# Patient Record
Sex: FEMALE | Race: WHITE | Employment: UNEMPLOYED | URBAN - METROPOLITAN AREA
[De-identification: names, ages, dates, MRNs, and addresses within clinical notes are randomized per-mention and may not be internally consistent; named-entity substitution may affect disease eponyms.]

---

## 2017-01-03 ENCOUNTER — OFFICE VISIT (OUTPATIENT)
Dept: CARDIOLOGY CLINIC | Age: 37
End: 2017-01-03

## 2017-01-03 DIAGNOSIS — Z95.818 STATUS POST PLACEMENT OF IMPLANTABLE LOOP RECORDER: Primary | ICD-10-CM

## 2017-01-10 ENCOUNTER — OFFICE VISIT (OUTPATIENT)
Dept: CARDIOLOGY CLINIC | Age: 37
End: 2017-01-10

## 2017-01-10 DIAGNOSIS — Z95.818 STATUS POST PLACEMENT OF IMPLANTABLE LOOP RECORDER: Primary | ICD-10-CM

## 2017-01-23 ENCOUNTER — OFFICE VISIT (OUTPATIENT)
Dept: CARDIOLOGY CLINIC | Age: 37
End: 2017-01-23

## 2017-01-23 DIAGNOSIS — Z95.818 STATUS POST PLACEMENT OF IMPLANTABLE LOOP RECORDER: Primary | ICD-10-CM

## 2017-02-27 ENCOUNTER — OFFICE VISIT (OUTPATIENT)
Dept: CARDIOLOGY CLINIC | Age: 37
End: 2017-02-27

## 2017-02-27 DIAGNOSIS — Z95.818 STATUS POST PLACEMENT OF IMPLANTABLE LOOP RECORDER: Primary | ICD-10-CM

## 2017-11-05 ENCOUNTER — APPOINTMENT (OUTPATIENT)
Dept: CT IMAGING | Age: 37
End: 2017-11-05
Attending: EMERGENCY MEDICINE
Payer: SELF-PAY

## 2017-11-05 ENCOUNTER — HOSPITAL ENCOUNTER (EMERGENCY)
Age: 37
Discharge: HOME | End: 2017-11-05
Attending: EMERGENCY MEDICINE
Payer: SELF-PAY

## 2017-11-05 VITALS
WEIGHT: 165 LBS | DIASTOLIC BLOOD PRESSURE: 72 MMHG | OXYGEN SATURATION: 100 % | HEIGHT: 63 IN | TEMPERATURE: 98 F | RESPIRATION RATE: 16 BRPM | HEART RATE: 69 BPM | SYSTOLIC BLOOD PRESSURE: 101 MMHG | BODY MASS INDEX: 29.23 KG/M2

## 2017-11-05 DIAGNOSIS — F07.81 POST CONCUSSION SYNDROME: ICD-10-CM

## 2017-11-05 DIAGNOSIS — S09.90XA CLOSED HEAD INJURY, INITIAL ENCOUNTER: Primary | ICD-10-CM

## 2017-11-05 PROCEDURE — 99283 EMERGENCY DEPT VISIT LOW MDM: CPT

## 2017-11-05 PROCEDURE — 70450 CT HEAD/BRAIN W/O DYE: CPT

## 2017-11-05 RX ORDER — LAMOTRIGINE 200 MG/1
TABLET ORAL 2 TIMES DAILY
COMMUNITY

## 2017-11-05 NOTE — ED PROVIDER NOTES
Patient is a 30 y.o. female presenting with head injury. Head Injury          Past Medical History:   Diagnosis Date    Anxiety     Depression     Disc herniation     L5-S1    Fainting     Fatigue     Headache     Memory loss     Sleep apnea     mild       Past Surgical History:   Procedure Laterality Date    HX GI  2010    GASTRIC SLEEVE    HX LAP CHOLECYSTECTOMY  7/11/13    by Dr Alin Dunham HX OTHER SURGICAL      plantar warts exc. drew. feet (multiple times under local)    HX OTHER SURGICAL  2010    gastric sleeve    IMPLANT  LOOP RECORDER  7/17/2015              Family History:   Problem Relation Age of Onset    Diabetes Mother     Other Mother      s/p gastric bypass    Hypertension Father     Headache Father      MI, STENTS, A FIB    Stroke Father     Other Sister      s/p gastric bypass; low bone mass    Asthma Maternal Aunt     Diabetes Maternal Grandmother     Asthma Maternal Grandfather     Cancer Neg Hx     Malignant Hyperthermia Neg Hx     Pseudocholinesterase Deficiency Neg Hx     Delayed Awakening Neg Hx     Post-op Nausea/Vomiting Neg Hx     Asthma Maternal Aunt        Social History     Social History    Marital status:      Spouse name: N/A    Number of children: N/A    Years of education: N/A     Occupational History    Not on file. Social History Main Topics    Smoking status: Never Smoker    Smokeless tobacco: Never Used    Alcohol use 0.0 - 0.6 oz/week     0 - 1 Standard drinks or equivalent per week      Comment: occasional    Drug use: No    Sexual activity: Yes     Partners: Female     Other Topics Concern    Not on file     Social History Narrative         ALLERGIES: Betadine [povidone-iodine] and Iodine    Review of Systems   All other systems reviewed and are negative.       Vitals:    11/05/17 1111   BP: 107/66   Pulse: 69   Resp: 16   SpO2: 100%   Weight: 74.8 kg (165 lb)   Height: 5' 3\" (1.6 m)            Physical Exam Constitutional: She is oriented to person, place, and time. She appears well-developed and well-nourished. HENT:   Head: Normocephalic and atraumatic. Mouth/Throat: Oropharynx is clear and moist. No oropharyngeal exudate. Eyes: Conjunctivae are normal. No scleral icterus. Neck: Neck supple. No thyromegaly present. Cardiovascular: Normal rate, regular rhythm and normal heart sounds. Exam reveals no gallop and no friction rub. No murmur heard. Pulmonary/Chest: Effort normal and breath sounds normal. No stridor. No respiratory distress. She has no wheezes. She has no rales. Abdominal: Soft. Bowel sounds are normal. There is no tenderness. There is no rebound and no guarding. Musculoskeletal: Normal range of motion. Lymphadenopathy:     She has no cervical adenopathy. Neurological: She is alert and oriented to person, place, and time. Skin: Skin is warm and dry. Psychiatric: She has a normal mood and affect.         [facility]  ED Course       Procedures

## 2017-11-05 NOTE — ED NOTES
Dr. Arlette Donahue at bedside to evaluate patient. Patient updated regarding plan of care and associated time constraints. Patient verbalizes understanding and agreement to current care plan. Call bell in reach. Will continue to monitor.

## 2017-11-05 NOTE — ED TRIAGE NOTES
Patient brought to ED treatment area via wheelchair for complaint of \"she had two seizures last year and the one on Friday she was standing int he bed of the truck and fell backwards hitting her head on the brick pavers. Since then she has had balance issues, memory issues, and feels dizzy. \" Patient reports nausea and vomiting Friday evening.

## 2017-11-05 NOTE — DISCHARGE INSTRUCTIONS
Learning About a Closed Head Injury  What is a closed head injury? A closed head injury happens when your head gets hit hard. The strong force of the blow causes your brain to shake in your skull. This movement can cause the brain to bruise, swell, or tear. Sometimes nerves or blood vessels also get damaged. This can cause bleeding in or around the brain. A concussion is a type of closed head injury. What are the symptoms? If you have a mild concussion, you may have a mild headache or feel \"not quite right. \" These symptoms are common. They usually go away over a few days to 4 weeks. But sometimes after a concussion, you feel like you can't function as well as before the injury. And you have new symptoms. This is called postconcussive syndrome. You may:  · Find it harder to solve problems, think, concentrate, or remember. · Have headaches. · Have changes in your sleep patterns, such as not being able to sleep or sleeping all the time. · Have changes in your personality. · Not be interested in your usual activities. · Feel angry or anxious without a clear reason. · Lose your sense of taste or smell. · Be dizzy, lightheaded, or unsteady. It may be hard to stand or walk. How is a closed head injury treated? Any person who may have a concussion needs to see a doctor. Some people have to stay in the hospital to be watched. Others can go home safely. If you go home, follow your doctor's instructions. He or she will tell you if you need someone to watch you closely for the next 24 hours or longer. Rest is the best treatment. Get plenty of sleep at night. And try to rest during the day. · Avoid activities that are physically or mentally demanding. These include housework, exercise, and schoolwork. And don't play video games, send text messages, or use the computer. You may need to change your school or work schedule to be able to avoid these activities.   · Ask your doctor when it's okay to drive, ride a bike, or operate machinery. · Take an over-the-counter pain medicine, such as acetaminophen (Tylenol), ibuprofen (Advil, Motrin), or naproxen (Aleve). Be safe with medicines. Read and follow all instructions on the label. · Check with your doctor before you use any other medicines for pain. · Do not drink alcohol or use illegal drugs. They can slow recovery. They can also increase your risk of getting a second head injury. Follow-up care is a key part of your treatment and safety. Be sure to make and go to all appointments, and call your doctor if you are having problems. It's also a good idea to know your test results and keep a list of the medicines you take. Where can you learn more? Go to http://williams-alberto.info/. Enter E235 in the search box to learn more about \"Learning About a Closed Head Injury. \"  Current as of: October 14, 2016  Content Version: 11.4  © 3370-4995 [employer]. Care instructions adapted under license by [employer] (which disclaims liability or warranty for this information). If you have questions about a medical condition or this instruction, always ask your healthcare professional. [place] [address] any warranty or liability for your use of this information. Postconcussion Syndrome: Care Instructions  Your Care Instructions    Postconcussion syndrome occurs after a blow to the head or body. Common symptoms are changes in the ability to concentrate, think, remember, or solve problems. Symptoms, which may include headaches, personality changes, and dizziness, may be related to stress from the events surrounding the accident that caused the injury. Follow-up care is a key part of your treatment and safety. Be sure to make and go to all appointments, and call your doctor if you are having problems. It's also a good idea to know your test results and keep a list of the medicines you take.   How can you care for yourself at home? Pain  · Rest is the best treatment for postconcussion syndrome. · Do not drive if you have taken a prescription pain medicine. · Rest in a quiet, dark room until your headache is gone. Close your eyes and try to relax or go to sleep. Do not watch TV or read. · Put a cold, moist cloth or cold pack on the painful area for 10 to 20 minutes at a time. Put a thin cloth between the cold pack and your skin. · Have someone gently massage your neck and shoulders. · Take your medicines exactly as prescribed. Call your doctor if you think you are having a problem with your medicine. You will get more details on the specific medicines your doctor prescribes. Stress  · Try to reduce stress. Some ways to do this include:  ¨ Taking slow, deep breaths. ¨ Soaking in a warm bath. ¨ Listening to soothing music. ¨ Taking a yoga class. ¨ Having a massage or back rub. ¨ Drinking a warm, nonalcoholic, noncaffeinated beverage. · Get enough sleep. · Eat a healthy, balanced diet. A balanced diet includes whole grains, dairy, fruits and vegetables, and protein. Eat a variety of foods from each of those groups so you get all the nutrients you need. · Avoid alcohol and illegal drugs. · Try relaxation exercises, such as breathing and muscle relaxation exercises. · Talk to your doctor about counseling. It may help you deal with stress from your accident. When should you call for help? Watch closely for changes in your health, and be sure to contact your doctor if:  ? · You do not get better as expected. ? · Your symptoms, such as headaches, trouble concentrating, or changes in mood, get worse. Where can you learn more? Go to http://williams-alberto.info/. Enter L952 in the search box to learn more about \"Postconcussion Syndrome: Care Instructions. \"  Current as of: October 14, 2016  Content Version: 11.4  © 6381-9714 [employer].  Care instructions adapted under license by [employer] (which disclaims liability or warranty for this information). If you have questions about a medical condition or this instruction, always ask your healthcare professional. [address] any warranty or liability for your use of this information.

## 2017-11-05 NOTE — ED NOTES
Patient discharged by Dr. Lennox Host. Patient and wife have no further questions or concerns at this time. Patient took all personal belongings at discharge. Patient ambulated out of ED with wife.

## 2017-11-13 ENCOUNTER — HOSPITAL ENCOUNTER (EMERGENCY)
Age: 37
Discharge: HOME | End: 2017-11-13
Attending: EMERGENCY MEDICINE
Payer: SELF-PAY

## 2017-11-13 VITALS
SYSTOLIC BLOOD PRESSURE: 122 MMHG | OXYGEN SATURATION: 100 % | DIASTOLIC BLOOD PRESSURE: 70 MMHG | RESPIRATION RATE: 18 BRPM | TEMPERATURE: 98.1 F | HEART RATE: 95 BPM | WEIGHT: 152 LBS | HEIGHT: 66 IN | BODY MASS INDEX: 24.43 KG/M2

## 2017-11-13 DIAGNOSIS — R55 SYNCOPE AND COLLAPSE: ICD-10-CM

## 2017-11-13 DIAGNOSIS — S81.812A LEG LACERATION, LEFT, INITIAL ENCOUNTER: Primary | ICD-10-CM

## 2017-11-13 PROCEDURE — 77030002888 HC SUT CHRMC J&J -A

## 2017-11-13 PROCEDURE — 77030002916 HC SUT ETHLN J&J -A

## 2017-11-13 PROCEDURE — 75810000294 HC INTERM/LAYERED WND RPR

## 2017-11-13 PROCEDURE — 74011000250 HC RX REV CODE- 250: Performed by: NURSE PRACTITIONER

## 2017-11-13 PROCEDURE — 77030018836 HC SOL IRR NACL ICUM -A

## 2017-11-13 PROCEDURE — 99282 EMERGENCY DEPT VISIT SF MDM: CPT

## 2017-11-13 RX ORDER — LIDOCAINE HYDROCHLORIDE AND EPINEPHRINE 10; 10 MG/ML; UG/ML
1.5 INJECTION, SOLUTION INFILTRATION; PERINEURAL ONCE
Status: COMPLETED | OUTPATIENT
Start: 2017-11-13 | End: 2017-11-13

## 2017-11-13 RX ADMIN — LIDOCAINE HYDROCHLORIDE,EPINEPHRINE BITARTRATE 15 MG: 10; .01 INJECTION, SOLUTION INFILTRATION; PERINEURAL at 12:49

## 2017-11-13 NOTE — ED PROVIDER NOTES
HPI Comments: Anthony Claudio is a 29 yo WF presenting to ED via car with c/o leg laceration x just PTA. The patient states that she has a hx of seizures and memory lapses. The patient states that she was outside near a grill and when she woke up, the grill was on top of her and she had a laceration to her left leg. The patient denies CP/Chills/head injury or other trauma. A review of the patient's previous medical records indicate a history of seizures and lacerations. The patient is in NAD at this time. PCP: Gali Floyd MD    There were no other complaints, changes, physical findings at this time. Past Medical History:  No date: Anxiety  No date: Depression  No date: Disc herniation      Comment: L5-S1  No date: Fainting  No date: Fatigue  No date: Headache  No date: Memory loss  No date: Sleep apnea      Comment: mild      The history is provided by the patient. No  was used. Past Medical History:   Diagnosis Date    Anxiety     Depression     Disc herniation     L5-S1    Fainting     Fatigue     Headache     Memory loss     Sleep apnea     mild       Past Surgical History:   Procedure Laterality Date    HX GI  2010    GASTRIC SLEEVE    HX LAP CHOLECYSTECTOMY  7/11/13    by Dr Forde Crimes HX OTHER SURGICAL      plantar warts exc. drew.  feet (multiple times under local)    HX OTHER SURGICAL  2010    gastric sleeve    IMPLANT  LOOP RECORDER  7/17/2015              Family History:   Problem Relation Age of Onset    Diabetes Mother     Other Mother      s/p gastric bypass    Hypertension Father     Headache Father      MI, STENTS, A FIB    Stroke Father     Other Sister      s/p gastric bypass; low bone mass    Asthma Maternal Aunt     Diabetes Maternal Grandmother     Asthma Maternal Grandfather     Cancer Neg Hx     Malignant Hyperthermia Neg Hx     Pseudocholinesterase Deficiency Neg Hx     Delayed Awakening Neg Hx     Post-op Nausea/Vomiting Neg Hx     Asthma Maternal Aunt        Social History     Social History    Marital status:      Spouse name: N/A    Number of children: N/A    Years of education: N/A     Occupational History    Not on file. Social History Main Topics    Smoking status: Never Smoker    Smokeless tobacco: Never Used    Alcohol use 0.0 - 0.6 oz/week     0 - 1 Standard drinks or equivalent per week      Comment: occasional    Drug use: No    Sexual activity: Yes     Partners: Female     Other Topics Concern    Not on file     Social History Narrative         ALLERGIES: Betadine [povidone-iodine] and Iodine    Review of Systems   Constitutional: Negative. Negative for chills, diaphoresis and fever. HENT: Negative. Negative for congestion, rhinorrhea and trouble swallowing. Eyes: Negative. Respiratory: Negative. Negative for shortness of breath. Cardiovascular: Negative. Gastrointestinal: Negative. Negative for abdominal pain, nausea and vomiting. Endocrine: Negative. Musculoskeletal: Negative for arthralgias, myalgias, neck pain and neck stiffness. Skin: Positive for wound. Negative for rash. Laceration to left inner thigh   Allergic/Immunologic: Negative. Neurological: Negative. Negative for dizziness, syncope, weakness and headaches. Hematological: Negative. Psychiatric/Behavioral: Negative. Vitals:    11/13/17 1233   BP: 122/70   Pulse: 95   Resp: 18   Temp: 98.1 °F (36.7 °C)   SpO2: 100%   Weight: 68.9 kg (152 lb)   Height: 5' 6\" (1.676 m)            Physical Exam   Constitutional: She is oriented to person, place, and time. Vital signs are normal. She appears well-developed and well-nourished. Non-toxic appearance. She does not have a sickly appearance. She does not appear ill. HENT:   Head: Normocephalic and atraumatic. Eyes: Conjunctivae, EOM and lids are normal. Pupils are equal, round, and reactive to light.    Neck: Trachea normal, normal range of motion and full passive range of motion without pain. Neck supple. Cardiovascular: Normal rate, regular rhythm, normal heart sounds and normal pulses. Pulmonary/Chest: Effort normal and breath sounds normal.   Abdominal: Soft. Normal appearance and bowel sounds are normal.   Musculoskeletal: Normal range of motion. Neurological: She is alert and oriented to person, place, and time. She has normal strength. GCS eye subscore is 4. GCS verbal subscore is 5. GCS motor subscore is 6. Skin: Skin is warm and dry. Laceration noted. See attached photo   Psychiatric: She has a normal mood and affect. Her speech is normal and behavior is normal. Judgment and thought content normal. Cognition and memory are normal.   Nursing note and vitals reviewed. MDM  Number of Diagnoses or Management Options  Leg laceration, left, initial encounter: minor  Syncope and collapse: minor  Risk of Complications, Morbidity, and/or Mortality  Presenting problems: minimal  Diagnostic procedures: minimal  Management options: low    Patient Progress  Patient progress: stable    ED Course       Procedures    Procedure Note - Laceration Repair:  1:00 PM  Procedure by All RUANO. Complexity: complex  5 cm linear laceration to thigh  was irrigated copiously with NS under jet lavage, prepped with Hibiclens and draped in a sterile fashion. The area was anesthetized with 9 mLs of  Lidocaine 1% with epinephrine via local infiltration. The wound was explored with the following results: No foreign bodies found. The wound was repaired with Two layer suture closure: Subcutaneous Layer:  1 sutures placed, stitch type:horizontal mattress, suture: 4-0 chromic gut. Skin Layer:  7 sutures placed, stitch type:simple interrupted, suture: 4-0 nylon. .  The wound was closed with good hemostasis and approximation. Sterile dressing applied.   Estimated blood loss: 5 ml's  The procedure took 1-15 minutes, and pt tolerated well. LABORATORY TESTS:  No results found for this or any previous visit (from the past 12 hour(s)). IMAGING RESULTS:    MEDICATIONS GIVEN:  Medications   lidocaine-EPINEPHrine (XYLOCAINE) 1 %-1:100,000 injection 15 mg (15 mg SubCUTAneous Given by Provider 11/13/17 9003)       IMPRESSION:  1. Leg laceration, left, initial encounter    2. Syncope and collapse        PLAN:  1. Sutures out in 8-10 days  2. F/U with PCP and Neurology  Return to ED if worse    Discharge Note  1:11 PM  The patient is ready for discharge. The patient's signs, symptoms, diagnosis, and discharge instructions have been discussed and the patient has conveyed their understanding. The patient is to follow up as recommended or return to the ER should their symptoms worsen. Plan has been discussed and the patient is in agreement. Noah Wise Pagé FNP-BC.

## 2017-11-13 NOTE — DISCHARGE INSTRUCTIONS
Fainting: Care Instructions  Your Care Instructions    When you faint, or pass out, you lose consciousness for a short time. A brief drop in blood flow to the brain often causes it. When you fall or lie down, more blood flows to your brain and you regain consciousness. Emotional stress, pain, or overheating-especially if you have been standing-can make you faint. In these cases, fainting is usually not serious. But fainting can be a sign of a more serious problem. Your doctor may want you to have more tests to rule out other causes. The treatment you need depends on the reason why you fainted. The doctor has checked you carefully, but problems can develop later. If you notice any problems or new symptoms, get medical treatment right away. Follow-up care is a key part of your treatment and safety. Be sure to make and go to all appointments, and call your doctor if you are having problems. It's also a good idea to know your test results and keep a list of the medicines you take. How can you care for yourself at home? · Drink plenty of fluids to prevent dehydration. If you have kidney, heart, or liver disease and have to limit fluids, talk with your doctor before you increase your fluid intake. When should you call for help? Call 911 anytime you think you may need emergency care. For example, call if:  ? · You have symptoms of a heart problem. These may include:  ¨ Chest pain or pressure. ¨ Severe trouble breathing. ¨ A fast or irregular heartbeat. ¨ Lightheadedness or sudden weakness. ¨ Coughing up pink, foamy mucus. ¨ Passing out. After you call 911, the  may tell you to chew 1 adult-strength or 2 to 4 low-dose aspirin. Wait for an ambulance. Do not try to drive yourself. ? · You have symptoms of a stroke. These may include:  ¨ Sudden numbness, tingling, weakness, or loss of movement in your face, arm, or leg, especially on only one side of your body. ¨ Sudden vision changes.   ¨ Sudden trouble speaking. ¨ Sudden confusion or trouble understanding simple statements. ¨ Sudden problems with walking or balance. ¨ A sudden, severe headache that is different from past headaches. ? · You passed out (lost consciousness) again. ? Watch closely for changes in your health, and be sure to contact your doctor if:  ? · You do not get better as expected. Where can you learn more? Go to http://williams-alberto.info/. Enter N969 in the search box to learn more about \"Fainting: Care Instructions. \"  Current as of: March 20, 2017  Content Version: 11.4  © 5444-0004 [employer]. Care instructions adapted under license by [employer] (which disclaims liability or warranty for this information). If you have questions about a medical condition or this instruction, always ask your healthcare professional. [address] any warranty or liability for your use of this information. Cuts Closed With Stitches: Care Instructions  Your Care Instructions  A cut can happen anywhere on your body. The doctor used stitches to close the cut. Using stitches also helps the cut heal and reduces scarring. Sometimes pieces of tape called Steri-Strips are put over the stitches. If the cut went deep and through the skin, the doctor may have put in two layers of stitches. The deeper layer brings the deep part of the cut together. These stitches will dissolve and don't need to be removed. The stitches in the upper layer are the ones you see on the cut. You will probably have a bandage over the stitches. You will need to have the stitches removed, usually in 7 to 14 days. The doctor has checked you carefully, but problems can develop later. If you notice any problems or new symptoms, get medical treatment right away. Follow-up care is a key part of your treatment and safety. Be sure to make and go to all appointments, and call your doctor if you are having problems. It's also a good idea to know your test results and keep a list of the medicines you take. How can you care for yourself at home? · Keep the cut dry for the first 24 to 48 hours. After this, you can shower if your doctor okays it. Pat the cut dry. · Don't soak the cut, such as in a bathtub. Your doctor will tell you when it's safe to get the cut wet. · If your doctor told you how to care for your cut, follow your doctor's instructions. If you did not get instructions, follow this general advice:  ¨ After the first 24 to 48 hours, wash around the cut with clean water 2 times a day. Don't use hydrogen peroxide or alcohol, which can slow healing. ¨ You may cover the cut with a thin layer of petroleum jelly, such as Vaseline, and a nonstick bandage. ¨ Apply more petroleum jelly and replace the bandage as needed. · Prop up the sore area on a pillow anytime you sit or lie down during the next 3 days. Try to keep it above the level of your heart. This will help reduce swelling. · Avoid any activity that could cause your cut to reopen. · Do not remove the stitches on your own. Your doctor will tell you when to come back to have the stitches removed. · Leave Steri-Strips on until they fall off. · Be safe with medicines. Read and follow all instructions on the label. ¨ If the doctor gave you a prescription medicine for pain, take it as prescribed. ¨ If you are not taking a prescription pain medicine, ask your doctor if you can take an over-the-counter medicine. When should you call for help? Call your doctor now or seek immediate medical care if:  ? · You have new pain, or your pain gets worse. ? · The skin near the cut is cold or pale or changes color. ? · You have tingling, weakness, or numbness near the cut.   ? · The cut starts to bleed, and blood soaks through the bandage.  Oozing small amounts of blood is normal.   ? · You have trouble moving the area near the cut.   ? · You have symptoms of infection, such as:  ¨ Increased pain, swelling, warmth, or redness around the cut. ¨ Red streaks leading from the cut. ¨ Pus draining from the cut. ¨ A fever. ? Watch closely for changes in your health, and be sure to contact your doctor if:  ? · The cut reopens. ? · You do not get better as expected. Where can you learn more? Go to http://williams-alberto.info/. Enter R217 in the search box to learn more about \"Cuts Closed With Stitches: Care Instructions. \"  Current as of: March 20, 2017  Content Version: 11.4  © 1828-8638 [employer]. Care instructions adapted under license by [employer] (which disclaims liability or warranty for this information). If you have questions about a medical condition or this instruction, always ask your healthcare professional. [address] any warranty or liability for your use of this information. We hope that we have addressed all of your medical concerns. The examination and treatment you received in the Emergency Department were for an emergent problem and were not intended as complete care. It is important that you follow up with your healthcare provider(s) for ongoing care. If your symptoms worsen or do not improve as expected, and you are unable to reach your usual health care provider(s), you should return to the Emergency Department. Today's healthcare is undergoing tremendous change, and patient satisfaction surveys are one of the many tools to assess the quality of medical care. You may receive a survey from the [employer] organization regarding your experience in the Emergency Department. I hope that your experience has been completely positive, particularly the medical care that I provided. As such, please participate in the survey; anything less than excellent does not meet my expectations or intentions.         [facility] and [facility] participate in nationally recognized quality of care measures. If your blood pressure is greater than 120/80, as reported below, we urge that you seek medical care to address the potential of high blood pressure, commonly known as hypertension. Hypertension can be hereditary or can be caused by certain medical conditions, pain, stress, or \"white coat syndrome. \"       Please make an appointment with your health care provider(s) for follow up of your Emergency Department visit. VITALS:   Patient Vitals for the past 8 hrs:   Temp Pulse Resp BP SpO2   11/13/17 1233 98.1 °F (36.7 °C) 95 18 122/70 100 %          Thank you for allowing us to provide you with medical care today. We realize that you have many choices for your emergency care needs. Please choose us in the future for any continued health care needs. ALHAJI [address]: [phone]            No results found for this or any previous visit (from the past 24 hour(s)). No results found.

## 2017-11-13 NOTE — ED NOTES
Pamela Neil NP at bedside to evaluate patient. Patient in no distress. Bleeding controlled at this time. Patient updated regarding plan of care and time constraints. Patient verbalizes understanding and agreement. Call bell in reach. Will continue to monitor.

## 2017-11-13 NOTE — ED NOTES
Danie Adame, NP at bedside to perform wound repair. Patient tolerating procedure well. Discussed lacerations with patient, as she has been seen at this facility several times for lacerations. Patient assures Danie Adame and this RN that she is safe at home and has caregivers for seizure.

## 2017-11-13 NOTE — ED NOTES
Xeroform gauze applied to right upper leg wound and covered with telfa. Secured with belinda gauze. The patient was discharged home by Yesenia Bosch NP in stable condition. The patient is alert and oriented, in no respiratory distress. The patient's diagnosis, condition and treatment were explained. The patient expressed understanding. No prescriptions given. No work/school note given. A discharge plan has been developed. A  was not involved in the process. Aftercare instructions were given. Pt ambulatory out of the ED.

## 2017-11-13 NOTE — ED TRIAGE NOTES
Pt rpts being outside and had a seizure and landed on her grill causing a 5 1/2 cm laceration to her right thigh. Bleeding controlled.

## 2017-12-14 ENCOUNTER — APPOINTMENT (OUTPATIENT)
Dept: GENERAL RADIOLOGY | Age: 37
End: 2017-12-14
Attending: EMERGENCY MEDICINE
Payer: SELF-PAY

## 2017-12-14 ENCOUNTER — HOSPITAL ENCOUNTER (EMERGENCY)
Age: 37
Discharge: HOME | End: 2017-12-14
Attending: EMERGENCY MEDICINE
Payer: SELF-PAY

## 2017-12-14 VITALS
SYSTOLIC BLOOD PRESSURE: 108 MMHG | HEIGHT: 66 IN | DIASTOLIC BLOOD PRESSURE: 71 MMHG | BODY MASS INDEX: 24.11 KG/M2 | HEART RATE: 78 BPM | OXYGEN SATURATION: 100 % | TEMPERATURE: 97.6 F | RESPIRATION RATE: 16 BRPM | WEIGHT: 150 LBS

## 2017-12-14 DIAGNOSIS — S20.212A CONTUSION OF LEFT CHEST WALL, INITIAL ENCOUNTER: Primary | ICD-10-CM

## 2017-12-14 PROCEDURE — A4565 SLINGS: HCPCS

## 2017-12-14 PROCEDURE — 73000 X-RAY EXAM OF COLLAR BONE: CPT

## 2017-12-14 PROCEDURE — 99283 EMERGENCY DEPT VISIT LOW MDM: CPT

## 2017-12-14 NOTE — DISCHARGE INSTRUCTIONS
Chest Contusion: Care Instructions  Your Care Instructions  A chest contusion, or bruise, is caused by a fall or direct blow to the chest. Car crashes, falls, getting punched, and injury from bicycle handlebars are common causes of chest contusions. A very forceful blow to the chest can injure the heart or blood vessels in the chest, the lungs, the airway, the liver, or the spleen. Pain may be caused by an injury to muscles, cartilage, or ribs. Deep breathing, coughing, or sneezing can increase your pain. Lying on the injured area also can cause pain. Follow-up care is a key part of your treatment and safety. Be sure to make and go to all appointments, and call your doctor if you are having problems. It's also a good idea to know your test results and keep a list of the medicines you take. How can you care for yourself at home? · Rest and protect the injured or sore area. Stop, change, or take a break from any activity that may be causing your pain. · Put ice or a cold pack on the area for 10 to 20 minutes at a time. Put a thin cloth between the ice and your skin. · After 2 or 3 days, if your swelling is gone, apply a heating pad set on low or a warm cloth to your chest. Some doctors suggest that you go back and forth between hot and cold. Put a thin cloth between the heating pad and your skin. · Do not wrap or tape your ribs for support. This may cause you to take smaller breaths, which could increase your risk of pneumonia and lung collapse. · Ask your doctor if you can take an over-the-counter pain medicine, such as acetaminophen (Tylenol), ibuprofen (Advil, Motrin), or naproxen (Aleve). Be safe with medicines. Read and follow all instructions on the label. · Take your medicines exactly as prescribed. Call your doctor if you think you are having a problem with your medicine.   · Gentle stretching and massage may help you feel better after a few days of rest. Stretch slowly to the point just before discomfort begins, then hold the stretch for at least 15 to 30 seconds. Do this 3 or 4 times per day. · As your pain gets better, slowly return to your normal activities. Be patient, because chest bruises can take weeks or months to heal. Any increased pain may be a sign that you need to rest a while longer. When should you call for help? Call 911 anytime you think you may need emergency care. For example, call if:  ? · You have severe trouble breathing. ? · You cough up blood. ?Call your doctor now or seek immediate medical care if:  ? · You have belly pain. ? · You are dizzy or lightheaded, or you feel like you may faint. ? · You develop new symptoms with the chest pain. ? · Your chest pain gets worse. ? · You have a fever. ? · You have some shortness of breath. ? · You have a cough that brings up mucus from the lungs. ? Watch closely for changes in your health, and be sure to contact your doctor if:  ? · Your chest pain is not improving after 1 week. Where can you learn more? Go to http://williams-alberto.info/. Enter I174 in the search box to learn more about \"Chest Contusion: Care Instructions. \"  Current as of: March 20, 2017  Content Version: 11.4  © 5016-8545 [employer]. Care instructions adapted under license by [employer] (which disclaims liability or warranty for this information). If you have questions about a medical condition or this instruction, always ask your healthcare professional. [place] [address] any warranty or liability for your use of this information.

## 2017-12-14 NOTE — ED TRIAGE NOTES
Pt ambulatory to treatment area with c/o \"last night at the  I had a seizure in the bathroom and I hurt my L shoulder. \"  Pt states \"i just woke up on the floor and I have a bruise over my left collar bone. \"  + bruising/redness over L collar bone. Dr. Iraheta Rounds at bedside to evaluate.

## 2017-12-14 NOTE — ED PROVIDER NOTES
HPI Comments: Patient is a 44-year-old white female with past medical history of long-standing seizure disorder for which she takes Lamictal. She states she has been compliant with her medications. She experienced a breakthrough seizure yesterday and struck her left collarbone against a countertop. Since that time she has had pain in the left clavicle area. She denies other injury. She has occasional breakthrough seizures. She denies other complaints at this time. The history is provided by the patient. Past Medical History:   Diagnosis Date    Anxiety     Depression     Disc herniation     L5-S1    Fainting     Fatigue     Headache     Memory loss     Sleep apnea     mild       Past Surgical History:   Procedure Laterality Date    HX GI  2010    GASTRIC SLEEVE    HX LAP CHOLECYSTECTOMY  7/11/13    by Dr Haris Somers HX OTHER SURGICAL      plantar warts exc. drew. feet (multiple times under local)    HX OTHER SURGICAL  2010    gastric sleeve    IMPLANT  LOOP RECORDER  7/17/2015              Family History:   Problem Relation Age of Onset    Diabetes Mother     Other Mother      s/p gastric bypass    Hypertension Father     Headache Father      MI, STENTS, A FIB    Stroke Father     Other Sister      s/p gastric bypass; low bone mass    Asthma Maternal Aunt     Diabetes Maternal Grandmother     Asthma Maternal Grandfather     Asthma Maternal Aunt     Cancer Neg Hx     Malignant Hyperthermia Neg Hx     Pseudocholinesterase Deficiency Neg Hx     Delayed Awakening Neg Hx     Post-op Nausea/Vomiting Neg Hx        Social History     Social History    Marital status:      Spouse name: N/A    Number of children: N/A    Years of education: N/A     Occupational History    Not on file.      Social History Main Topics    Smoking status: Never Smoker    Smokeless tobacco: Never Used    Alcohol use 0.0 - 0.6 oz/week     0 - 1 Standard drinks or equivalent per week Comment: occasional    Drug use: No    Sexual activity: Yes     Partners: Female     Other Topics Concern    Not on file     Social History Narrative         ALLERGIES: Betadine [povidone-iodine] and Iodine    Review of Systems   Constitutional: Negative. Negative for appetite change, fever and unexpected weight change. HENT: Negative. Negative for ear pain, hearing loss, nosebleeds, rhinorrhea, sore throat and trouble swallowing. Respiratory: Negative. Negative for cough, chest tightness and shortness of breath. Cardiovascular: Negative. Negative for chest pain and palpitations. Gastrointestinal: Negative. Negative for abdominal distention, abdominal pain, blood in stool and vomiting. Endocrine: Negative. Genitourinary: Negative for dysuria and hematuria. Musculoskeletal: Negative. Negative for back pain and myalgias. Skin: Negative. Negative for rash. Allergic/Immunologic: Negative. Neurological: Positive for seizures. Negative for dizziness, syncope, weakness and numbness. Hematological: Negative. Psychiatric/Behavioral: Negative. All other systems reviewed and are negative. Vitals:    12/14/17 1131   BP: 108/71   Pulse: 78   Resp: 16   Temp: 97.6 °F (36.4 °C)   SpO2: 100%   Weight: 68 kg (150 lb)   Height: 5' 6\" (1.676 m)            Physical Exam   Constitutional: She is oriented to person, place, and time. She appears well-developed and well-nourished. No distress. HENT:   Head: Normocephalic and atraumatic. Right Ear: External ear normal.   Left Ear: External ear normal.   Nose: Nose normal.   Mouth/Throat: Oropharynx is clear and moist.   Eyes: Conjunctivae and EOM are normal. Pupils are equal, round, and reactive to light. Neck: Normal range of motion. Neck supple. No JVD present. No thyromegaly present. Cardiovascular: Normal rate, regular rhythm, normal heart sounds and intact distal pulses. No murmur heard.   Pulmonary/Chest: Effort normal and breath sounds normal. No respiratory distress. She has no wheezes. She has no rales. Abdominal: Soft. Bowel sounds are normal. She exhibits no distension. There is no tenderness. Musculoskeletal: Normal range of motion. She exhibits tenderness. She exhibits no edema or deformity. Left anterior clavicle and chest area with contusion over left clavicle and left upper chest. This area is tender to palpation. She has full range of motion of her left shoulder. There is no obvious deformity. There is no skin laceration. Neurological: She is alert and oriented to person, place, and time. No cranial nerve deficit. Skin: Skin is warm and dry. No rash noted. Psychiatric: She has a normal mood and affect. Her behavior is normal. Thought content normal.   Vitals reviewed. MDM  Number of Diagnoses or Management Options  Diagnosis management comments:   Assessment and plan: Left clavicle injury. We'll check x-ray to assess for fracture. No other injuries noted. Known seizure disorder, no further workup for that needed at this time. 12:02 PM  Xray negative for fracture. Will d/c home. Sling for comfort only. Ice. Elevate. nsaids prn.  F/u as needed. Patient understands and agrees with plan.        Amount and/or Complexity of Data Reviewed  Tests in the radiology section of CPT®: ordered and reviewed      ED Course       Procedures